# Patient Record
Sex: FEMALE | Race: WHITE | NOT HISPANIC OR LATINO | Employment: FULL TIME | URBAN - METROPOLITAN AREA
[De-identification: names, ages, dates, MRNs, and addresses within clinical notes are randomized per-mention and may not be internally consistent; named-entity substitution may affect disease eponyms.]

---

## 2019-05-22 ENCOUNTER — OFFICE VISIT (OUTPATIENT)
Dept: INTERNAL MEDICINE | Facility: CLINIC | Age: 24
End: 2019-05-22

## 2019-05-22 VITALS
RESPIRATION RATE: 18 BRPM | DIASTOLIC BLOOD PRESSURE: 78 MMHG | WEIGHT: 133.38 LBS | SYSTOLIC BLOOD PRESSURE: 110 MMHG | HEART RATE: 92 BPM | OXYGEN SATURATION: 99 % | TEMPERATURE: 98.8 F | HEIGHT: 62 IN | BODY MASS INDEX: 24.54 KG/M2

## 2019-05-22 DIAGNOSIS — Z13.0 SCREENING FOR BLOOD DISEASE: ICD-10-CM

## 2019-05-22 DIAGNOSIS — Z13.21 ENCOUNTER FOR VITAMIN DEFICIENCY SCREENING: ICD-10-CM

## 2019-05-22 DIAGNOSIS — R42 LIGHT HEADEDNESS: ICD-10-CM

## 2019-05-22 DIAGNOSIS — Z13.29 SCREENING FOR THYROID DISORDER: ICD-10-CM

## 2019-05-22 DIAGNOSIS — J01.90 ACUTE BACTERIAL SINUSITIS: ICD-10-CM

## 2019-05-22 DIAGNOSIS — K90.49 DAIRY PRODUCT INTOLERANCE: ICD-10-CM

## 2019-05-22 DIAGNOSIS — K21.00 GASTROESOPHAGEAL REFLUX DISEASE WITH ESOPHAGITIS: Primary | ICD-10-CM

## 2019-05-22 DIAGNOSIS — E53.8 FOLATE DEFICIENCY: ICD-10-CM

## 2019-05-22 DIAGNOSIS — E16.2 HYPOGLYCEMIA: ICD-10-CM

## 2019-05-22 DIAGNOSIS — R53.83 FATIGUE, UNSPECIFIED TYPE: ICD-10-CM

## 2019-05-22 DIAGNOSIS — L71.9 ROSACEA: ICD-10-CM

## 2019-05-22 DIAGNOSIS — B96.89 ACUTE BACTERIAL SINUSITIS: ICD-10-CM

## 2019-05-22 DIAGNOSIS — D22.9 MULTIPLE NEVI: ICD-10-CM

## 2019-05-22 DIAGNOSIS — R25.1 SHAKINESS: ICD-10-CM

## 2019-05-22 DIAGNOSIS — R11.0 NAUSEA: ICD-10-CM

## 2019-05-22 PROCEDURE — 82306 VITAMIN D 25 HYDROXY: CPT | Performed by: NURSE PRACTITIONER

## 2019-05-22 PROCEDURE — 83036 HEMOGLOBIN GLYCOSYLATED A1C: CPT | Performed by: NURSE PRACTITIONER

## 2019-05-22 PROCEDURE — 82607 VITAMIN B-12: CPT | Performed by: NURSE PRACTITIONER

## 2019-05-22 PROCEDURE — 80050 GENERAL HEALTH PANEL: CPT | Performed by: NURSE PRACTITIONER

## 2019-05-22 PROCEDURE — 83013 H PYLORI (C-13) BREATH: CPT | Performed by: NURSE PRACTITIONER

## 2019-05-22 PROCEDURE — 82746 ASSAY OF FOLIC ACID SERUM: CPT | Performed by: NURSE PRACTITIONER

## 2019-05-22 RX ORDER — OMEPRAZOLE 20 MG/1
20 CAPSULE, DELAYED RELEASE ORAL DAILY
Qty: 30 CAPSULE | Refills: 5 | Status: SHIPPED | OUTPATIENT
Start: 2019-05-22

## 2019-05-22 NOTE — PROGRESS NOTES
Subjective   Lynette Ordaz is a 23 y.o. female here today to establish care as a new patient. She has history of severe GERD symptoms that is worsening. Heartburn, reflux, and nausea seem to be induced by a variety of foods including dairy. She is having a difficult time finding things she can eat without getting upset stomach. She frequently has hypoglycemic symptoms of shakiness, nausea, weakness, and light headiness. She eats foods high in protein but does not eat much carbs. She is attempt to eat more frequent meals with complex carbs. She has 3 day history of upper and lower airway congestion with headache, sore throat, dry cough, and fatigue. She believes her symptoms have improved some today. She has history of Rosacea and multiple nevi on her arms and back. She would like consultation with dermatology for rosacea and full body exam. She denies any shortness of air or chest pain.       Chief Complaint   Patient presents with   • Establish Care   • Heartburn   • Cough     dry cough, and scratchy throat       Review of Systems   Constitutional: Positive for appetite change and fatigue. Negative for activity change, chills, diaphoresis, fever, unexpected weight gain and unexpected weight loss.   HENT: Positive for congestion, sinus pressure, sore throat and voice change. Negative for ear discharge, ear pain, mouth sores, nosebleeds, sneezing and trouble swallowing.    Eyes: Negative for pain, discharge and itching.   Respiratory: Positive for cough. Negative for chest tightness, shortness of breath and wheezing.    Cardiovascular: Negative for chest pain, palpitations and leg swelling.   Gastrointestinal: Positive for nausea, GERD and indigestion. Negative for abdominal pain, constipation, diarrhea and vomiting.   Endocrine: Negative for heat intolerance, polydipsia and polyphagia.   Genitourinary: Negative for dysuria, flank pain, frequency, hematuria and urgency.   Musculoskeletal: Positive for myalgias and  neck stiffness. Negative for arthralgias, back pain, gait problem, joint swelling and neck pain.   Skin: Negative for color change, pallor and rash.   Allergic/Immunologic: Positive for environmental allergies. Negative for immunocompromised state.   Neurological: Positive for light-headedness. Negative for seizures, speech difficulty, weakness and numbness.        Shakiness   Hematological: Negative for adenopathy.   Psychiatric/Behavioral: Negative for agitation, decreased concentration, sleep disturbance and depressed mood. The patient is not nervous/anxious.        Past Medical History:   Diagnosis Date   • GERD (gastroesophageal reflux disease)      Past Surgical History:   Procedure Laterality Date   • EXCISION BONE TUMOR Right     right femur     Family History   Problem Relation Age of Onset   • No Known Problems Mother    • No Known Problems Father    • Cancer Maternal Grandfather    • Diabetes Paternal Grandfather      Social History     Socioeconomic History   • Marital status: Single     Spouse name: Not on file   • Number of children: Not on file   • Years of education: Not on file   • Highest education level: Not on file   Tobacco Use   • Smoking status: Never Smoker   • Smokeless tobacco: Never Used   Substance and Sexual Activity   • Alcohol use: Yes     Alcohol/week: 0.6 oz     Types: 1 Glasses of wine per week     Comment: once month   • Drug use: No     Allergies   Allergen Reactions   • Percocet [Oxycodone-Acetaminophen] Hives and GI Intolerance         Current Outpatient Medications:   •  azithromycin (ZITHROMAX) 500 MG tablet, Take 1 tablet by mouth Daily for 5 days., Disp: 5 tablet, Rfl: 0  •  glucose blood test strip, Check blood sugar once in the morning upon waking up. Check throughout the day if having hypoglycemic symptoms., Disp: 50 each, Rfl: 2  •  glucose monitor monitoring kit, 1 each As Needed (Check blood sugars once in the morning and as needed throughout the day for hypoglycemic  "symptoms.)., Disp: 1 each, Rfl: 0  •  Lancets misc, Check blood sugar once in the morning and throughout the day as needed for hypoglycemic symptoms., Disp: 50 each, Rfl: 2  •  norelgestromin-ethinyl estradiol (XULANE) 150-35 MCG/24HR, Place 1 patch on the skin 1 (One) Time Per Week., Disp: , Rfl:   •  omeprazole (PRILOSEC) 20 MG capsule, Take 1 capsule by mouth Daily., Disp: 30 capsule, Rfl: 5  •  Prenatal MV & Min w/FA-DHA (PRENATAL ADULT GUMMY/DHA/FA) 0.4-25 MG chewable tablet, Chew 1 each Daily., Disp: 30 tablet, Rfl: 11    Objective   Vitals:    05/22/19 1435   BP: 110/78   BP Location: Left arm   Patient Position: Sitting   Cuff Size: Adult   Pulse: 92   Resp: 18   Temp: 98.8 °F (37.1 °C)   TempSrc: Temporal   SpO2: 99%   Weight: 60.5 kg (133 lb 6 oz)   Height: 157.5 cm (62\")   PainSc: 0-No pain     Body mass index is 24.39 kg/m².    Physical Exam   Constitutional: She is oriented to person, place, and time. She appears well-developed and well-nourished.   HENT:   Head: Normocephalic and atraumatic.   Nose: Mucosal edema and congestion present.   Mouth/Throat: Posterior oropharyngeal erythema present.   Eyes: EOM are normal. Pupils are equal, round, and reactive to light.   Neck: Normal range of motion.   Cardiovascular: Normal rate, regular rhythm and normal heart sounds.   Pulmonary/Chest: Effort normal and breath sounds normal.   Abdominal: Soft. Bowel sounds are normal.   Musculoskeletal: Normal range of motion.        Cervical back: She exhibits tenderness and spasm.   Neurological: She is alert and oriented to person, place, and time.   Skin: Skin is warm and dry.   Multiple nevi on arms and back. No suspicious lesions.    Psychiatric: She has a normal mood and affect. Her behavior is normal.   Vitals reviewed.      Assessment/Plan   Problem List Items Addressed This Visit        Digestive    Gastroesophageal reflux disease with esophagitis - Primary    Relevant Medications    omeprazole (PRILOSEC) 20 MG " capsule    Other Relevant Orders    Ambulatory Referral to Allergy    H. Pylori Breath Test - Breath, Lung    Folate deficiency    Relevant Medications    Prenatal MV & Min w/FA-DHA (PRENATAL ADULT GUMMY/DHA/FA) 0.4-25 MG chewable tablet       Endocrine    Hypoglycemia    Relevant Medications    glucose blood test strip    glucose monitor monitoring kit    Lancets misc    Other Relevant Orders    Comprehensive Metabolic Panel (Completed)    Hemoglobin A1c (Completed)       Musculoskeletal and Integument    Rosacea    Relevant Orders    Ambulatory Referral to Dermatology       Other    Dairy product intolerance    Relevant Orders    Ambulatory Referral to Allergy      Other Visit Diagnoses     Acute bacterial sinusitis        Relevant Medications    azithromycin (ZITHROMAX) 500 MG tablet    Multiple nevi        Relevant Orders    Ambulatory Referral to Dermatology    Fatigue, unspecified type        Relevant Orders    CBC & Differential (Completed)    Comprehensive Metabolic Panel (Completed)    TSH Rfx On Abnormal To Free T4 (Completed)    Vitamin B12 & Folate (Completed)    Vitamin D 25 Hydroxy (Completed)    CBC Auto Differential (Completed)    Nausea        Relevant Medications    glucose blood test strip    glucose monitor monitoring kit    Lancets misc    Other Relevant Orders    H. Pylori Breath Test - Breath, Lung    Shakiness        Relevant Medications    glucose blood test strip    glucose monitor monitoring kit    Lancets misc    Light headedness        Relevant Medications    glucose blood test strip    glucose monitor monitoring kit    Lancets misc    Screening for thyroid disorder        Relevant Orders    TSH Rfx On Abnormal To Free T4 (Completed)    Screening for blood disease        Relevant Orders    CBC & Differential (Completed)    Comprehensive Metabolic Panel (Completed)    TSH Rfx On Abnormal To Free T4 (Completed)    Vitamin B12 & Folate (Completed)    Vitamin D 25 Hydroxy (Completed)    CBC  Auto Differential (Completed)    Encounter for vitamin deficiency screening        Relevant Orders    Vitamin B12 & Folate (Completed)    Vitamin D 25 Hydroxy (Completed)             Discussed and educated her on hypoglycemia and importance of eating small frequent meals and snacks throughout the day with complex carbs. She will start keeping peanut butter crackers and candy in her purse for hypoglycemic events.     Plan of care reviewed with the patient at the conclusion of today's visit.  Education was provided regarding diagnosis, management, and any prescribed or recommended OTC medications.  Patient verbalized understanding of and agreement with management plan.     Return if symptoms worsen or fail to improve.      Gilda Gifford, APRN

## 2019-05-23 PROBLEM — K21.00 GASTROESOPHAGEAL REFLUX DISEASE WITH ESOPHAGITIS: Status: ACTIVE | Noted: 2019-05-23

## 2019-05-23 PROBLEM — E16.2 HYPOGLYCEMIA: Status: ACTIVE | Noted: 2019-05-23

## 2019-05-23 PROBLEM — K90.49 DAIRY PRODUCT INTOLERANCE: Status: ACTIVE | Noted: 2019-05-23

## 2019-05-23 PROBLEM — E53.8 FOLATE DEFICIENCY: Status: ACTIVE | Noted: 2019-05-23

## 2019-05-23 PROBLEM — L71.9 ROSACEA: Status: ACTIVE | Noted: 2019-05-23

## 2019-05-23 LAB
25(OH)D3 SERPL-MCNC: 57 NG/ML (ref 30–100)
ALBUMIN SERPL-MCNC: 5.1 G/DL (ref 3.5–5.2)
ALBUMIN/GLOB SERPL: 1.8 G/DL
ALP SERPL-CCNC: 86 U/L (ref 39–117)
ALT SERPL W P-5'-P-CCNC: 15 U/L (ref 1–33)
ANION GAP SERPL CALCULATED.3IONS-SCNC: 19.6 MMOL/L
AST SERPL-CCNC: 17 U/L (ref 1–32)
BASOPHILS # BLD AUTO: 0.05 10*3/MM3 (ref 0–0.2)
BASOPHILS NFR BLD AUTO: 0.3 % (ref 0–1.5)
BILIRUB SERPL-MCNC: 0.5 MG/DL (ref 0.2–1.2)
BUN BLD-MCNC: 11 MG/DL (ref 6–20)
BUN/CREAT SERPL: 13.9 (ref 7–25)
CALCIUM SPEC-SCNC: 9.6 MG/DL (ref 8.6–10.5)
CHLORIDE SERPL-SCNC: 98 MMOL/L (ref 98–107)
CO2 SERPL-SCNC: 25.4 MMOL/L (ref 22–29)
CREAT BLD-MCNC: 0.79 MG/DL (ref 0.57–1)
DEPRECATED RDW RBC AUTO: 45.4 FL (ref 37–54)
EOSINOPHIL # BLD AUTO: 0.08 10*3/MM3 (ref 0–0.4)
EOSINOPHIL NFR BLD AUTO: 0.6 % (ref 0.3–6.2)
ERYTHROCYTE [DISTWIDTH] IN BLOOD BY AUTOMATED COUNT: 12.9 % (ref 12.3–15.4)
FOLATE SERPL-MCNC: 6.88 NG/ML (ref 4.78–24.2)
GFR SERPL CREATININE-BSD FRML MDRD: 90 ML/MIN/1.73
GLOBULIN UR ELPH-MCNC: 2.8 GM/DL
GLUCOSE BLD-MCNC: 26 MG/DL (ref 65–99)
HBA1C MFR BLD: 5.02 % (ref 4.8–5.6)
HCT VFR BLD AUTO: 49.6 % (ref 34–46.6)
HGB BLD-MCNC: 15.6 G/DL (ref 12–15.9)
IMM GRANULOCYTES # BLD AUTO: 0.06 10*3/MM3 (ref 0–0.05)
IMM GRANULOCYTES NFR BLD AUTO: 0.4 % (ref 0–0.5)
LYMPHOCYTES # BLD AUTO: 1.84 10*3/MM3 (ref 0.7–3.1)
LYMPHOCYTES NFR BLD AUTO: 12.9 % (ref 19.6–45.3)
MCH RBC QN AUTO: 30.5 PG (ref 26.6–33)
MCHC RBC AUTO-ENTMCNC: 31.5 G/DL (ref 31.5–35.7)
MCV RBC AUTO: 96.9 FL (ref 79–97)
MONOCYTES # BLD AUTO: 1.14 10*3/MM3 (ref 0.1–0.9)
MONOCYTES NFR BLD AUTO: 8 % (ref 5–12)
NEUTROPHILS # BLD AUTO: 11.14 10*3/MM3 (ref 1.7–7)
NEUTROPHILS NFR BLD AUTO: 77.8 % (ref 42.7–76)
NRBC BLD AUTO-RTO: 0 /100 WBC (ref 0–0.2)
PLATELET # BLD AUTO: 356 10*3/MM3 (ref 140–450)
PMV BLD AUTO: 10.4 FL (ref 6–12)
POTASSIUM BLD-SCNC: 3.5 MMOL/L (ref 3.5–5.2)
PROT SERPL-MCNC: 7.9 G/DL (ref 6–8.5)
RBC # BLD AUTO: 5.12 10*6/MM3 (ref 3.77–5.28)
SODIUM BLD-SCNC: 143 MMOL/L (ref 136–145)
TSH SERPL DL<=0.05 MIU/L-ACNC: 1.34 MIU/ML (ref 0.27–4.2)
VIT B12 BLD-MCNC: 286 PG/ML (ref 211–946)
WBC NRBC COR # BLD: 14.31 10*3/MM3 (ref 3.4–10.8)

## 2019-05-23 RX ORDER — AZITHROMYCIN 500 MG/1
500 TABLET, FILM COATED ORAL DAILY
Qty: 5 TABLET | Refills: 0 | Status: SHIPPED | OUTPATIENT
Start: 2019-05-23 | End: 2019-05-28

## 2019-05-23 RX ORDER — LANCETS 30 GAUGE
EACH MISCELLANEOUS
Qty: 50 EACH | Refills: 2 | Status: SHIPPED | OUTPATIENT
Start: 2019-05-23

## 2019-05-23 RX ORDER — BLOOD-GLUCOSE METER
1 KIT MISCELLANEOUS AS NEEDED
Qty: 1 EACH | Refills: 0 | Status: SHIPPED | OUTPATIENT
Start: 2019-05-23

## 2019-05-24 NOTE — PROGRESS NOTES
I have reviewed the notes, assessments, and/or procedures performed by CHRISTIANO Hdz and I concur with her documentation of Lynette Ordaz.

## 2019-05-25 LAB — UREA BREATH TEST QL: NEGATIVE

## 2019-06-11 ENCOUNTER — OFFICE VISIT (OUTPATIENT)
Dept: INTERNAL MEDICINE | Facility: CLINIC | Age: 24
End: 2019-06-11

## 2019-06-11 VITALS
BODY MASS INDEX: 24.73 KG/M2 | TEMPERATURE: 97.6 F | WEIGHT: 134.38 LBS | RESPIRATION RATE: 18 BRPM | OXYGEN SATURATION: 99 % | DIASTOLIC BLOOD PRESSURE: 68 MMHG | HEART RATE: 77 BPM | HEIGHT: 62 IN | SYSTOLIC BLOOD PRESSURE: 110 MMHG

## 2019-06-11 DIAGNOSIS — Z01.419 ENCOUNTER FOR CERVICAL PAP SMEAR WITH PELVIC EXAM: ICD-10-CM

## 2019-06-11 DIAGNOSIS — B37.31 VAGINAL YEAST INFECTION: ICD-10-CM

## 2019-06-11 DIAGNOSIS — Z00.00 WELLNESS EXAMINATION: Primary | ICD-10-CM

## 2019-06-11 DIAGNOSIS — Z30.45 ENCOUNTER FOR SURVEILLANCE OF TRANSDERMAL PATCH HORMONAL CONTRACEPTIVE DEVICE: ICD-10-CM

## 2019-06-11 DIAGNOSIS — E16.2 HYPOGLYCEMIA: ICD-10-CM

## 2019-06-11 PROCEDURE — 99395 PREV VISIT EST AGE 18-39: CPT | Performed by: NURSE PRACTITIONER

## 2019-06-11 RX ORDER — FLUCONAZOLE 100 MG/1
100 TABLET ORAL DAILY
Qty: 7 TABLET | Refills: 0 | Status: SHIPPED | OUTPATIENT
Start: 2019-06-11 | End: 2019-06-18

## 2019-06-11 NOTE — PROGRESS NOTES
Subjective   Lynette Ordaz is a 23 y.o. female here today for ***. Blood glucsoe is much better eating complex carbs with every meal    Chief Complaint   Patient presents with   • Annual Exam       Review of Systems    Past Medical History:   Diagnosis Date   • GERD (gastroesophageal reflux disease)      Past Surgical History:   Procedure Laterality Date   • EXCISION BONE TUMOR Right     right femur     Family History   Problem Relation Age of Onset   • No Known Problems Mother    • No Known Problems Father    • Cancer Maternal Grandfather    • Diabetes Paternal Grandfather      Social History     Socioeconomic History   • Marital status: Single     Spouse name: Not on file   • Number of children: Not on file   • Years of education: Not on file   • Highest education level: Not on file   Tobacco Use   • Smoking status: Never Smoker   • Smokeless tobacco: Never Used   Substance and Sexual Activity   • Alcohol use: Yes     Alcohol/week: 0.6 oz     Types: 1 Glasses of wine per week     Comment: once month   • Drug use: No     Allergies   Allergen Reactions   • Percocet [Oxycodone-Acetaminophen] Hives and GI Intolerance       There is no immunization history on file for this patient.  Health Maintenance Due   Topic Date Due   • ANNUAL PHYSICAL  06/22/1998   • HPV VACCINES (1 - Female 3-dose series) 06/22/2010   • TDAP/TD VACCINES (1 - Tdap) 06/22/2014   • CHLAMYDIA SCREENING  06/26/2017   • PAP SMEAR  06/26/2017         Current Outpatient Medications:   •  glucose blood test strip, Check blood sugar once in the morning upon waking up. Check throughout the day if having hypoglycemic symptoms., Disp: 50 each, Rfl: 2  •  glucose monitor monitoring kit, 1 each As Needed (Check blood sugars once in the morning and as needed throughout the day for hypoglycemic symptoms.)., Disp: 1 each, Rfl: 0  •  Lancets misc, Check blood sugar once in the morning and throughout the day as needed for hypoglycemic symptoms., Disp: 50 each,  "Rfl: 2  •  norelgestromin-ethinyl estradiol (XULANE) 150-35 MCG/24HR, Place 1 patch on the skin 1 (One) Time Per Week., Disp: , Rfl:   •  omeprazole (PRILOSEC) 20 MG capsule, Take 1 capsule by mouth Daily., Disp: 30 capsule, Rfl: 5  •  Prenatal MV & Min w/FA-DHA (PRENATAL ADULT GUMMY/DHA/FA) 0.4-25 MG chewable tablet, Chew 1 each Daily., Disp: 30 tablet, Rfl: 11    Objective   Vitals:    06/11/19 1107   BP: 110/68   BP Location: Left arm   Patient Position: Sitting   Cuff Size: Adult   Pulse: 77   Resp: 18   Temp: 97.6 °F (36.4 °C)   TempSrc: Temporal   SpO2: 99%   Weight: 61 kg (134 lb 6 oz)   Height: 157.5 cm (62\")   PainSc: 0-No pain     Body mass index is 24.58 kg/m².    Physical Exam    Assessment/Plan   Problem List Items Addressed This Visit     None               Plan of care reviewed with the patient at the conclusion of today's visit.  Education was provided regarding diagnosis, management, and any prescribed or recommended OTC medications.  Patient verbalized understanding of and agreement with management plan.     No Follow-up on file.      CHRISTIANO Carmona  "

## 2019-06-11 NOTE — PROGRESS NOTES
Subjective   Lynette Ordaz is a 23 y.o. female here today for annual exam. She is eating a well balanced diet with complex carbs frequently at least 6 times daily. Blood sugars have been doing very well and averaging around 100. She is doing much better. She denies any shortness of air or chest pain.     Chief Complaint   Patient presents with   • Annual Exam       Review of Systems   Constitutional: Negative.  Negative for activity change, appetite change, chills, diaphoresis, fatigue, fever, unexpected weight gain and unexpected weight loss.   HENT: Negative.  Negative for ear discharge, ear pain, mouth sores, nosebleeds, sinus pressure, sneezing and sore throat.    Eyes: Negative.  Negative for pain, discharge and itching.   Respiratory: Negative.  Negative for cough, chest tightness, shortness of breath and wheezing.    Cardiovascular: Negative.  Negative for chest pain, palpitations and leg swelling.   Gastrointestinal: Negative.  Negative for abdominal pain, constipation, diarrhea, nausea and vomiting.   Endocrine: Negative.  Negative for heat intolerance, polydipsia and polyphagia.   Genitourinary: Negative.  Negative for dysuria, flank pain, frequency, hematuria and urgency.   Musculoskeletal: Negative.  Negative for arthralgias, back pain, gait problem, joint swelling, myalgias, neck pain and neck stiffness.   Skin: Negative.  Negative for color change, pallor and rash.   Allergic/Immunologic: Negative.  Negative for immunocompromised state.   Neurological: Negative.  Negative for seizures, speech difficulty, weakness and numbness.   Hematological: Negative.  Negative for adenopathy.   Psychiatric/Behavioral: Negative.  Negative for agitation, decreased concentration, sleep disturbance and depressed mood. The patient is not nervous/anxious.        Past Medical History:   Diagnosis Date   • GERD (gastroesophageal reflux disease)      Past Surgical History:   Procedure Laterality Date   • EXCISION BONE  TUMOR Right     right femur     Family History   Problem Relation Age of Onset   • No Known Problems Mother    • No Known Problems Father    • Cancer Maternal Grandfather    • Diabetes Paternal Grandfather      Social History     Socioeconomic History   • Marital status: Single     Spouse name: Not on file   • Number of children: Not on file   • Years of education: Not on file   • Highest education level: Not on file   Tobacco Use   • Smoking status: Never Smoker   • Smokeless tobacco: Never Used   Substance and Sexual Activity   • Alcohol use: Yes     Alcohol/week: 0.6 oz     Types: 1 Glasses of wine per week     Comment: once month   • Drug use: No     Allergies   Allergen Reactions   • Percocet [Oxycodone-Acetaminophen] Hives and GI Intolerance       There is no immunization history on file for this patient.  Health Maintenance Due   Topic Date Due   • ANNUAL PHYSICAL  06/22/1998   • HPV VACCINES (1 - Female 3-dose series) 06/22/2010   • TDAP/TD VACCINES (1 - Tdap) 06/22/2014   • CHLAMYDIA SCREENING  06/26/2017   • PAP SMEAR  06/26/2017         Current Outpatient Medications:   •  glucose blood test strip, Check blood sugar once in the morning upon waking up. Check throughout the day if having hypoglycemic symptoms., Disp: 50 each, Rfl: 2  •  glucose monitor monitoring kit, 1 each As Needed (Check blood sugars once in the morning and as needed throughout the day for hypoglycemic symptoms.)., Disp: 1 each, Rfl: 0  •  Lancets misc, Check blood sugar once in the morning and throughout the day as needed for hypoglycemic symptoms., Disp: 50 each, Rfl: 2  •  norelgestromin-ethinyl estradiol (XULANE) 150-35 MCG/24HR, Place 1 patch on the skin as directed by provider 1 (One) Time Per Week., Disp: 3 patch, Rfl: 11  •  omeprazole (PRILOSEC) 20 MG capsule, Take 1 capsule by mouth Daily., Disp: 30 capsule, Rfl: 5  •  Prenatal MV & Min w/FA-DHA (PRENATAL ADULT GUMMY/DHA/FA) 0.4-25 MG chewable tablet, Chew 1 each Daily., Disp:  "30 tablet, Rfl: 11  •  fluconazole (DIFLUCAN) 100 MG tablet, Take 1 tablet by mouth Daily for 7 days., Disp: 7 tablet, Rfl: 0    Objective   Vitals:    06/11/19 1107   BP: 110/68   BP Location: Left arm   Patient Position: Sitting   Cuff Size: Adult   Pulse: 77   Resp: 18   Temp: 97.6 °F (36.4 °C)   TempSrc: Temporal   SpO2: 99%   Weight: 61 kg (134 lb 6 oz)   Height: 157.5 cm (62\")   PainSc: 0-No pain     Body mass index is 24.58 kg/m².    Physical Exam   Constitutional: She is oriented to person, place, and time. She appears well-developed and well-nourished.   HENT:   Head: Normocephalic and atraumatic.   Eyes: EOM are normal. Pupils are equal, round, and reactive to light.   Neck: Normal range of motion.   Cardiovascular: Normal rate, regular rhythm and normal heart sounds.   Pulmonary/Chest: Effort normal and breath sounds normal.   Abdominal: Soft. Bowel sounds are normal. Hernia confirmed negative in the right inguinal area and confirmed negative in the left inguinal area.   Genitourinary: Rectum normal and uterus normal. Pelvic exam was performed with patient supine. There is no rash, tenderness, lesion, injury or Bartholin's cyst on the right labia. There is no rash, tenderness, lesion, injury or Bartholin's cyst on the left labia. Cervix exhibits discharge. Vaginal discharge found.   Genitourinary Comments: Large amount of white discharge of vagina and cervix.    Musculoskeletal: Normal range of motion.   Lymphadenopathy:        Right: No inguinal adenopathy present.        Left: No inguinal adenopathy present.   Neurological: She is alert and oriented to person, place, and time.   Skin: Skin is warm and dry.   Psychiatric: She has a normal mood and affect. Her behavior is normal.   Vitals reviewed.      Assessment/Plan   Problem List Items Addressed This Visit        Endocrine    Hypoglycemia    Relevant Medications    glucose blood test strip    glucose monitor monitoring kit    Lancets misc      Other " Visit Diagnoses     Wellness examination    -  Primary    Encounter for cervical Pap smear with pelvic exam        Vaginal yeast infection        Relevant Medications    fluconazole (DIFLUCAN) 100 MG tablet    Encounter for surveillance of transdermal patch hormonal contraceptive device        Relevant Medications    norelgestromin-ethinyl estradiol (XULANE) 150-35 MCG/24HR        Pap results may be inconclusive due to large amount of discharge and yeast. May need to be repeated after taking oral diflucan.       Counseling was given to patient for the following topics: appropriate exercise, healthy eating habits, disease prevention, risk factors for cancer, importance of self breast exam and breast health, importance of immunizations, including risks and benefits, birth control counseling including side effects, sun safety, seatbelt use, safe driving, safe sex, risks of smoking (including electronic cigarettes) including cancer and death, risks of alcohol consumption and recommend decreased intake or cessation and risks of marijuana and other illicit drugs.      Plan of care reviewed with the patient at the conclusion of today's visit.  Education was provided regarding diagnosis, management, and any prescribed or recommended OTC medications.  Patient verbalized understanding of and agreement with management plan.     Return in about 1 year (around 6/11/2020), or if symptoms worsen or fail to improve.      Gilda Gifford, CHRISTIANO

## 2020-06-12 ENCOUNTER — OFFICE VISIT (OUTPATIENT)
Dept: INTERNAL MEDICINE | Facility: CLINIC | Age: 25
End: 2020-06-12

## 2020-06-12 VITALS
BODY MASS INDEX: 24.29 KG/M2 | TEMPERATURE: 98.6 F | DIASTOLIC BLOOD PRESSURE: 74 MMHG | HEIGHT: 62 IN | OXYGEN SATURATION: 98 % | WEIGHT: 132 LBS | SYSTOLIC BLOOD PRESSURE: 120 MMHG | HEART RATE: 83 BPM

## 2020-06-12 DIAGNOSIS — Z00.00 WELLNESS EXAMINATION: Primary | ICD-10-CM

## 2020-06-12 DIAGNOSIS — M62.838 NECK MUSCLE SPASM: ICD-10-CM

## 2020-06-12 DIAGNOSIS — N92.6 IRREGULAR PERIODS/MENSTRUAL CYCLES: ICD-10-CM

## 2020-06-12 DIAGNOSIS — R11.0 NAUSEA: ICD-10-CM

## 2020-06-12 DIAGNOSIS — K21.9 GASTROESOPHAGEAL REFLUX DISEASE WITHOUT ESOPHAGITIS: ICD-10-CM

## 2020-06-12 DIAGNOSIS — Z30.45 ENCOUNTER FOR SURVEILLANCE OF TRANSDERMAL PATCH HORMONAL CONTRACEPTIVE DEVICE: ICD-10-CM

## 2020-06-12 PROCEDURE — 99395 PREV VISIT EST AGE 18-39: CPT | Performed by: NURSE PRACTITIONER

## 2020-06-12 PROCEDURE — 99213 OFFICE O/P EST LOW 20 MIN: CPT | Performed by: NURSE PRACTITIONER

## 2020-06-12 RX ORDER — ONDANSETRON 8 MG/1
TABLET, ORALLY DISINTEGRATING ORAL
Qty: 30 TABLET | Refills: 5 | Status: SHIPPED | OUTPATIENT
Start: 2020-06-12

## 2020-06-12 RX ORDER — METHOCARBAMOL 750 MG/1
TABLET, FILM COATED ORAL
Qty: 60 TABLET | Refills: 5 | Status: SHIPPED | OUTPATIENT
Start: 2020-06-12

## 2020-06-12 NOTE — PROGRESS NOTES
Subjective   Chief Complaint   Patient presents with   • Annual Exam       Lynette Ordaz is a 24 y.o. female here today for annual exam.  She has been having some neck pain and muscle tension.  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  She is also under a large amount of stress causing tension to be worse.  She has taken Tylenol and ibuprofen over-the-counter that has not helped much.  GERD symptoms have been intermittently unstable.  She sometimes experiences nausea and is requesting refill of Zofran.  She is taking Prilosec daily.  Having some menstrual cycle irregularity.  She ran out of her transdermal patch in the office did not want to fill this until she came in for her appointment.  She took urine pregnancy test at home that was negative.  Menstrual cycles have not been regular and she has breakthrough bleeding.  She had Pap last year that was normal.  No heavy bleeding.  No shortness of breath or chest pain.    Overall healthy: Yes  Regular exercise:  Yes  Diet is well balanced:  Yes  Vitamin Supplement:  Yes  Alcohol intake:  Yes, mild  Tobacco use:  No    Cardiovascular risk is low:  Yes   Menstrual cycle regular:  No  PAP:  Up to date  Concern for STDs:  No  Mammogram:  N/A, no family history  Last colon screening:  N/A, no family history, normal bowel movement, no blood.   Regular dental exam:  Yes   Regular eye exam:  No, will make appt  Immunizations up to date:  Yes  Wear a seatbelt regularly:  Yes  Wear sunscreen regularly when outdoors:  Yes    Review of Systems   Constitutional: Negative for activity change, appetite change, chills, diaphoresis, fatigue, fever, unexpected weight gain and unexpected weight loss.   HENT: Negative for ear discharge, ear pain, mouth sores, nosebleeds, sinus pressure, sneezing and sore throat.    Eyes: Negative for pain, discharge and itching.   Respiratory: Negative for cough, chest tightness, shortness of breath and wheezing.     Cardiovascular: Negative for chest pain, palpitations and leg swelling.   Gastrointestinal: Positive for nausea and GERD. Negative for abdominal pain, constipation, diarrhea and vomiting.   Endocrine: Negative for heat intolerance, polydipsia and polyphagia.   Genitourinary: Positive for menstrual problem. Negative for dysuria, flank pain, frequency, hematuria and urgency.   Musculoskeletal: Positive for myalgias. Negative for arthralgias, back pain, gait problem, joint swelling, neck pain and neck stiffness.   Skin: Negative for color change, pallor and rash.   Allergic/Immunologic: Negative for immunocompromised state.   Neurological: Negative for seizures, speech difficulty, weakness and numbness.   Hematological: Negative for adenopathy.   Psychiatric/Behavioral: Negative for agitation, decreased concentration, sleep disturbance, suicidal ideas and depressed mood. The patient is not nervous/anxious.        Past Medical History:   Diagnosis Date   • GERD (gastroesophageal reflux disease)      Past Surgical History:   Procedure Laterality Date   • EXCISION BONE TUMOR Right     right femur     Family History   Problem Relation Age of Onset   • No Known Problems Mother    • No Known Problems Father    • Cancer Maternal Grandfather    • Diabetes Paternal Grandfather      Social History     Tobacco Use   Smoking Status Never Smoker   Smokeless Tobacco Never Used      Social History     Substance and Sexual Activity   Alcohol Use Yes   • Alcohol/week: 1.0 standard drinks   • Types: 1 Glasses of wine per week    Comment: once month      Allergies   Allergen Reactions   • Percocet [Oxycodone-Acetaminophen] Hives and GI Intolerance       Current Outpatient Medications on File Prior to Visit   Medication Sig   • glucose blood test strip Check blood sugar once in the morning upon waking up. Check throughout the day if having hypoglycemic symptoms.   • glucose monitor monitoring kit 1 each As Needed (Check blood sugars  "once in the morning and as needed throughout the day for hypoglycemic symptoms.).   • Lancets misc Check blood sugar once in the morning and throughout the day as needed for hypoglycemic symptoms.   • omeprazole (PRILOSEC) 20 MG capsule Take 1 capsule by mouth Daily.   • Prenatal MV & Min w/FA-DHA (PRENATAL ADULT GUMMY/DHA/FA) 0.4-25 MG chewable tablet Chew 1 each Daily.   • [DISCONTINUED] norelgestromin-ethinyl estradiol (XULANE) 150-35 MCG/24HR Place 1 patch on the skin as directed by provider 1 (One) Time Per Week.     No current facility-administered medications on file prior to visit.        Objective   Vitals:    06/12/20 0947   BP: 120/74   BP Location: Right arm   Patient Position: Sitting   Pulse: 83   Temp: 98.6 °F (37 °C)   TempSrc: Temporal   SpO2: 98%   Weight: 59.9 kg (132 lb)   Height: 157.5 cm (62\")     Body mass index is 24.14 kg/m².    Physical Exam   Constitutional: She is oriented to person, place, and time. She appears well-developed and well-nourished.   HENT:   Head: Normocephalic and atraumatic.   Nose: Nose normal.   Eyes: Pupils are equal, round, and reactive to light. Conjunctivae and lids are normal.   Neck: Trachea normal. No thyromegaly present.   Cardiovascular: Normal rate, regular rhythm and normal heart sounds.   Pulmonary/Chest: Effort normal and breath sounds normal. No respiratory distress.   Musculoskeletal:        Cervical back: She exhibits spasm.   Neck muscle tension.   Neurological: She is alert and oriented to person, place, and time. She has normal strength. GCS eye subscore is 4. GCS verbal subscore is 5. GCS motor subscore is 6.   Skin: Skin is warm and dry.   Psychiatric: She has a normal mood and affect. Her speech is normal and behavior is normal. Thought content normal. Cognition and memory are normal.   Vitals reviewed.        Assessment/Plan     Current Outpatient Medications:   •  glucose blood test strip, Check blood sugar once in the morning upon waking up. " Check throughout the day if having hypoglycemic symptoms., Disp: 50 each, Rfl: 2  •  glucose monitor monitoring kit, 1 each As Needed (Check blood sugars once in the morning and as needed throughout the day for hypoglycemic symptoms.)., Disp: 1 each, Rfl: 0  •  Lancets misc, Check blood sugar once in the morning and throughout the day as needed for hypoglycemic symptoms., Disp: 50 each, Rfl: 2  •  methocarbamol (ROBAXIN) 750 MG tablet, Take 1/2-1 tablet by mouth 4 times daily as needed for muscle spasms., Disp: 60 tablet, Rfl: 5  •  norelgestromin-ethinyl estradiol (Xulane) 150-35 MCG/24HR, Place 1 patch on the skin as directed by provider 1 (One) Time Per Week., Disp: 3 patch, Rfl: 11  •  omeprazole (PRILOSEC) 20 MG capsule, Take 1 capsule by mouth Daily., Disp: 30 capsule, Rfl: 5  •  ondansetron ODT (ZOFRAN-ODT) 8 MG disintegrating tablet, Take 1/2 to 1 tablet by mouth (dissolve under tongue or swallow) every 8 hours as needed for nausea., Disp: 30 tablet, Rfl: 5  •  Prenatal MV & Min w/FA-DHA (PRENATAL ADULT GUMMY/DHA/FA) 0.4-25 MG chewable tablet, Chew 1 each Daily., Disp: 30 tablet, Rfl: 11    Lynette was seen today for annual exam.    Diagnoses and all orders for this visit:    Wellness examination  Patient will continue to eat a well-balanced diet, drink adequate amount of water, exercise at least 3 times weekly, and get adequate rest.  Suggested a multivitamin daily.  Discussed future preventative screenings and immunizations.  Patient had labs done last year that were relatively normal.  She does not want to get labs repeated today.    Neck muscle spasm  New onset issue unstable requiring medication management and monitoring. Will eat well balanced diet, increase water intake drinking at least 2 electrolyte balanced drinks daily such as propel or gatorade, increase physical activity as tolerated, and get adequate rest. Can use warmth or ice for discomfort in this area. Discussed stretching exercises and  sleeping positions to help with pain.   -     methocarbamol (ROBAXIN) 750 MG tablet; Take 1/2-1 tablet by mouth 4 times daily as needed for muscle spasms.    Gastroesophageal reflux disease without esophagitis  Chronic issue unstable with occasional nausea requiring medication management and monitoring. Will eat well balanced diet refraining from spicy and acidic foods, increase water intake refraining from soda/caffeine and alcohol, increase physical activity, and get adequate rest.   Continue Prilosec  -     ondansetron ODT (ZOFRAN-ODT) 8 MG disintegrating tablet; Take 1/2 to 1 tablet by mouth (dissolve under tongue or swallow) every 8 hours as needed for nausea.      Irregular periods/menstrual cycles  New issue requiring restart of transdermal contraceptive patch.  Pap is up-to-date and was normal in May 2020  -     norelgestromin-ethinyl estradiol (Xulane) 150-35 MCG/24HR; Place 1 patch on the skin as directed by provider 1 (One) Time Per Week.    Nausea  Recurrent issue and wants to have Zofran on hand.  Discussed eating diet low in acidity and spicy foods.  Will follow a brat diet if having symptoms.  We will follow-up if this becomes a regular issue.   -     ondansetron ODT (ZOFRAN-ODT) 8 MG disintegrating tablet; Take 1/2 to 1 tablet by mouth (dissolve under tongue or swallow) every 8 hours as needed for nausea.    Encounter for surveillance of transdermal patch hormonal contraceptive device  Patient's prescription ran out.  She just took a urine pregnancy test at home that was negative.  She will restart transdermal patch. She was educated on signs and symptoms of deep vein thrombosis and when to go to the emergency room.  She will follow-up if experiencing any nausea, headaches, dizziness, or mood instability.   -     norelgestromin-ethinyl estradiol (Xulane) 150-35 MCG/24HR; Place 1 patch on the skin as directed by provider 1 (One) Time Per Week.             Counseling was given to patient for the  following topics: appropriate exercise, healthy eating habits, disease prevention, risk factors for cancer, importance of self breast exam and breast health, importance of immunizations, including risks and benefits, bone health, sun safety and seatbelt use.      Plan of care reviewed with the patient at the conclusion of today's visit.  Education was provided regarding diagnosis, management, and any prescribed or recommended OTC medications.  Patient verbalized understanding of and agreement with management plan.     Return if symptoms worsen or fail to improve.      Gilda Gifford, CHRISTIANO    Please note that portions of this note were completed with a voice recognition program. Efforts were made to edit the dictations, but occasionally words are mistranscribed.

## 2021-03-01 ENCOUNTER — PRIOR AUTHORIZATION (OUTPATIENT)
Dept: INTERNAL MEDICINE | Facility: CLINIC | Age: 26
End: 2021-03-01

## 2021-03-01 NOTE — TELEPHONE ENCOUNTER
Prior Authorization for Ondansetron 8mg tablets completed and sent to plan via Covermymeds. Status pending;   KEY:[id number]   PA Case ID: [id number]

## 2021-09-07 DIAGNOSIS — N92.6 IRREGULAR PERIODS/MENSTRUAL CYCLES: ICD-10-CM

## 2021-09-07 DIAGNOSIS — Z30.45 ENCOUNTER FOR SURVEILLANCE OF TRANSDERMAL PATCH HORMONAL CONTRACEPTIVE DEVICE: ICD-10-CM

## 2021-09-07 RX ORDER — NORELGESTROMIN AND ETHINYL ESTRADIOL 150; 35 UG/D; UG/D
1 PATCH TRANSDERMAL WEEKLY
Qty: 3 PATCH | Refills: 11 | OUTPATIENT
Start: 2021-09-07

## 2021-09-07 NOTE — TELEPHONE ENCOUNTER
Caller: OrdazLynette    Relationship: Self    Best call back number: [phone]    Medication needed:   Requested Prescriptions     Pending Prescriptions Disp Refills   • norelgestromin-ethinyl estradiol (Xulane) 150-35 MCG/24HR 3 patch 11     Sig: Place 1 patch on the skin as directed by provider 1 (One) Time Per Week.       When do you need the refill by: TODAY    What additional details did the patient provide when requesting the medication: PATIENT NEEDS THIS FILLED TODAY. SHE HAS NO REFILLS    Does the patient have less than a 3 day supply:  [x] Yes  [] No    What is the patient's preferred pharmacy: [facility] [address] [place] [address] - [address] [place] [address] [phone] [facility] [phone] FX

## 2021-09-10 DIAGNOSIS — Z30.45 ENCOUNTER FOR SURVEILLANCE OF TRANSDERMAL PATCH HORMONAL CONTRACEPTIVE DEVICE: ICD-10-CM

## 2021-09-10 DIAGNOSIS — N92.6 IRREGULAR PERIODS/MENSTRUAL CYCLES: ICD-10-CM

## 2021-09-10 RX ORDER — NORELGESTROMIN AND ETHINLY ESTRADIOL 150; 35 UG/D; UG/D
PATCH TRANSDERMAL
Qty: 3 PATCH | Refills: 11 | OUTPATIENT
Start: 2021-09-10